# Patient Record
Sex: MALE | Race: BLACK OR AFRICAN AMERICAN | Employment: FULL TIME | URBAN - METROPOLITAN AREA
[De-identification: names, ages, dates, MRNs, and addresses within clinical notes are randomized per-mention and may not be internally consistent; named-entity substitution may affect disease eponyms.]

---

## 2019-11-11 ENCOUNTER — ED HISTORICAL/CONVERTED ENCOUNTER (OUTPATIENT)
Dept: OTHER | Age: 24
End: 2019-11-11

## 2022-01-05 ENCOUNTER — HOSPITAL ENCOUNTER (EMERGENCY)
Age: 27
Discharge: HOME | End: 2022-01-05
Payer: COMMERCIAL

## 2022-01-05 VITALS
SYSTOLIC BLOOD PRESSURE: 142 MMHG | TEMPERATURE: 97.9 F | WEIGHT: 212 LBS | HEART RATE: 84 BPM | DIASTOLIC BLOOD PRESSURE: 86 MMHG | BODY MASS INDEX: 33.27 KG/M2 | RESPIRATION RATE: 16 BRPM | HEIGHT: 67 IN | OXYGEN SATURATION: 97 %

## 2022-01-05 DIAGNOSIS — Z11.52 ENCOUNTER FOR SCREENING FOR COVID-19: Primary | ICD-10-CM

## 2022-01-05 PROCEDURE — U0005 INFEC AGEN DETEC AMPLI PROBE: HCPCS

## 2022-01-05 PROCEDURE — 99281 EMR DPT VST MAYX REQ PHY/QHP: CPT

## 2022-01-05 NOTE — ED PROVIDER NOTES
EMERGENCY DEPARTMENT HISTORY AND PHYSICAL EXAM      Date: (Not on file)  Patient Name: Chante Tipton    History of Presenting Illness     Chief Complaint   Patient presents with    Covid Testing       History Provided By: Patient    HPI: Chante Tipton, 27 y.o. male with a past medical history significant No significant past medical history presents to the ED for covid screening. Pt reports that his daughter recently tested positive for covid and he would like to be tested. Pt is asymptomatic and has no further concerns. Denies being vaccinated against covid yet. He specifically denies any chest pain, shortness of breath, leg swelling, fever, chills, cough, congestion, sore throat, body aches, abdominal pain, nausea, vomit, diarrhea, headaches, light headedness, dizziness, diaphoresis, or rash. There are no other complaints, changes, or physical findings at this time. PCP: No primary care provider on file. No current facility-administered medications on file prior to encounter. No current outpatient medications on file prior to encounter. Past History     Past Medical History:  History reviewed. No pertinent past medical history. Past Surgical History:  History reviewed. No pertinent surgical history. Family History:  History reviewed. No pertinent family history. Social History:  Social History     Tobacco Use    Smoking status: Current Every Day Smoker    Smokeless tobacco: Never Used    Tobacco comment: cigars   Vaping Use    Vaping Use: Never used   Substance Use Topics    Alcohol use: Yes     Comment: on occasion     Drug use: Not Currently       Allergies:  No Known Allergies      Review of Systems     Review of Systems   Constitutional: Negative. Negative for chills, diaphoresis and fever. HENT: Negative for congestion, rhinorrhea, sore throat and trouble swallowing. Eyes: Negative. Negative for pain.    Respiratory: Negative for cough, chest tightness, shortness of breath and wheezing. Cardiovascular: Negative. Negative for chest pain, palpitations and leg swelling. Gastrointestinal: Negative. Negative for abdominal distention, abdominal pain, constipation, diarrhea, nausea and vomiting. Genitourinary: Negative. Negative for difficulty urinating, dysuria, flank pain, frequency and hematuria. Musculoskeletal: Negative for back pain, gait problem and myalgias. Skin: Negative. Negative for rash. Neurological: Negative. Negative for dizziness, weakness, light-headedness, numbness and headaches. Psychiatric/Behavioral: Negative. All other systems reviewed and are negative. Physical Exam     Physical Exam  Vitals and nursing note reviewed. Constitutional:       General: He is not in acute distress. Appearance: Normal appearance. He is not ill-appearing or toxic-appearing. Comments: overweight   HENT:      Head: Normocephalic and atraumatic. Nose: No congestion. Mouth/Throat:      Mouth: Mucous membranes are moist.      Pharynx: Oropharynx is clear. Uvula midline. No oropharyngeal exudate or posterior oropharyngeal erythema. Tonsils: No tonsillar exudate. Eyes:      Extraocular Movements: Extraocular movements intact. Conjunctiva/sclera: Conjunctivae normal.      Pupils: Pupils are equal, round, and reactive to light. Cardiovascular:      Rate and Rhythm: Normal rate. Pulses: Normal pulses. Heart sounds: Normal heart sounds. No murmur heard. No friction rub. No gallop. Pulmonary:      Effort: Pulmonary effort is normal. No respiratory distress. Breath sounds: Normal breath sounds. No wheezing, rhonchi or rales. Abdominal:      General: Bowel sounds are normal. There is no distension. Palpations: Abdomen is soft. Tenderness: There is no abdominal tenderness. There is no guarding or rebound. Musculoskeletal:         General: No tenderness. Cervical back: Neck supple. No tenderness. Right lower leg: No edema. Left lower leg: No edema. Skin:     General: Skin is warm and dry. Capillary Refill: Capillary refill takes less than 2 seconds. Findings: No rash. Neurological:      General: No focal deficit present. Mental Status: He is alert and oriented to person, place, and time. Psychiatric:         Mood and Affect: Mood normal.         Behavior: Behavior normal.         Thought Content: Thought content normal.         Lab and Diagnostic Study Results     Labs -   No results found for this or any previous visit (from the past 12 hour(s)). Radiologic Studies -   @lastxrresult@  CT Results  (Last 48 hours)    None        CXR Results  (Last 48 hours)    None            Medical Decision Making   - I am the first provider for this patient. - I reviewed the vital signs, available nursing notes, past medical history, past surgical history, family history and social history. - Initial assessment performed. The patients presenting problems have been discussed, and they are in agreement with the care plan formulated and outlined with them. I have encouraged them to ask questions as they arise throughout their visit. Vital Signs-Reviewed the patient's vital signs. Patient Vitals for the past 12 hrs:   Temp Pulse Resp BP SpO2   01/05/22 1823 97.9 °F (36.6 °C) 84 16 (!) 142/86 97 %       Records Reviewed: Nursing Notes and Old Medical Records       Provider Notes (Medical Decision Making):     MDM  Number of Diagnoses or Management Options  Diagnosis management comments: Diagnosis management comments: Pt presents for COVID screening d/t positive sick contacts at school. [facility] mother denies any acute URI symptoms including nasal congestion, rhinorrhea, cough, fever, or sore throat.  Pt is well-appearing with stable vitals and benign exam.  She will be swabbed for COVID-19 and mother will be informed of test results promptly.      Should she develop sx's in the meantime, mother advised of symptomatic therapy including: acetaminophen, ibuprofen, antihistamine-decongestant of choice, cough suppressant of choice. Increase fluids, use vaporizer, stay in steamy bathroom tid 15 min prn severe cough, tylenol as needed, rest, avoid smoky areas. Lack of antibiotic effectiveness discussed with her. Symptomatic therapy suggested: gargle for sore throat, use mist at bedside for congestion.  Apply facial warm packs for sinus pain or use nasal saline sprays. Follow up prn if not better in 72 hours. Amount and/or Complexity of Data Reviewed  Clinical lab tests: ordered and reviewed  Review and summarize past medical records: yes         ED Course:   6:23 PM  Pt has been advised that covid is a send out test and they will be notified within the next 3-4 days if positive. They have no additional complaints or concerns and all of their questions have been answered. Anticipate discharge home shortly. Procedures   Medical Decision Makingedical Decision Making  Performed by: Charlene Pruett PA-C  PROCEDURES:  Procedures       Disposition   Disposition: DC- Adult Discharges: All of the diagnostic tests were reviewed and questions answered. Diagnosis, care plan and treatment options were discussed. The patient understands the instructions and will follow up as directed. The patients results have been reviewed with them. They have been counseled regarding their diagnosis. The patient verbally convey understanding and agreement of the signs, symptoms, diagnosis, treatment and prognosis and additionally agrees to follow up as recommended with their PCP in 24 - 48 hours. They also agree with the care-plan and convey that all of their questions have been answered.   I have also put together some discharge instructions for them that include: 1) educational information regarding their diagnosis, 2) how to care for their diagnosis at home, as well a 3) list of reasons why they would want to return to the ED prior to their follow-up appointment, should their condition change. Discharged    DISCHARGE PLAN:  1. There are no discharge medications for this patient. 2.   Follow-up Information     Follow up With Specialties Details [address]   As needed [address] [place] [address]  [phone]        3. Return to ED if worse   4. There are no discharge medications for this patient. Diagnosis     Clinical Impression:   1. Encounter for screening for COVID-19        Attestations:    Flaquito Tolbert PA-C    Please note that this dictation was completed with [employer], the computer voice recognition software. Quite often unanticipated grammatical, syntax, homophones, and other interpretive errors are inadvertently transcribed by the computer software. Please disregard these errors. Please excuse any errors that have escaped final proofreading. Thank you.

## 2022-01-05 NOTE — Clinical Note
[facility] EMERGENCY DEPT  [address] [place] [id number]  [phone]    Work/School Note    Date: 1/5/2022     To Whom It May concern:    Krishan Tabor was evaluated by the following provider(s):  Physician Assistant: Shanae Cough virus is suspected. Per the CDC guidelines we recommend home isolation until the following conditions are all met:    1. At least five days have passed since symptoms first appeared and/or had a close exposure,   2. After home isolation for five days, wearing a mask around others for the next five days,  3. At least 24 have passed since last fever without the use of fever-reducing medications and  4.  Symptoms (eg cough, shortness of breath) have improved      Sincerely,          [facility], DESIRAE

## 2022-01-06 LAB — SARS-COV-2, COV2: NORMAL

## 2022-01-08 LAB
SARS-COV-2, XPLCVT: NOT DETECTED
SOURCE, COVRS: NORMAL

## 2024-06-11 ENCOUNTER — TELEPHONE (OUTPATIENT)
Facility: CLINIC | Age: 29
End: 2024-06-11

## 2024-06-11 NOTE — TELEPHONE ENCOUNTER
----- Message from Shazia Scruggs sent at 6/11/2024 10:43 AM EDT -----  Subject: Appointment Request    Reason for Call: Established Patient Appointment needed: Routine Existing   Condition Follow Up    QUESTIONS    Reason for appointment request? Available appointments did not meet   patient need     Additional Information for Provider? Pt states he is an est pt with office   of [facility] could not locate any such info for pt with   this office. Pt states he was seen a couple months ago and told to make a   follow up appt. Pt states he saw a male provider but did not have name of   provider. [facility] offered appt with the NP's in office, pt declined. Pt has no   symptoms just follow up as instructed per last appt. Please call pt.   ---------------------------------------------------------------------------  --------------  CALL BACK INFO  [phone]; OK to leave message on voicemail  ---------------------------------------------------------------------------  --------------  SCRIPT ANSWERS